# Patient Record
Sex: MALE | Race: WHITE | NOT HISPANIC OR LATINO | URBAN - NONMETROPOLITAN AREA
[De-identification: names, ages, dates, MRNs, and addresses within clinical notes are randomized per-mention and may not be internally consistent; named-entity substitution may affect disease eponyms.]

---
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
## 2024-01-05 ENCOUNTER — OFFICE VISIT (OUTPATIENT)
Dept: URGENT CARE | Facility: PHYSICIAN GROUP | Age: 24
End: 2024-01-05
Payer: COMMERCIAL

## 2024-01-05 VITALS
SYSTOLIC BLOOD PRESSURE: 133 MMHG | HEART RATE: 79 BPM | OXYGEN SATURATION: 99 % | DIASTOLIC BLOOD PRESSURE: 78 MMHG | WEIGHT: 223 LBS | RESPIRATION RATE: 16 BRPM | BODY MASS INDEX: 31.92 KG/M2 | HEIGHT: 70 IN | TEMPERATURE: 98.3 F

## 2024-01-05 DIAGNOSIS — R21 RASH: ICD-10-CM

## 2024-01-05 DIAGNOSIS — L03.316 CELLULITIS OF UMBILICUS: ICD-10-CM

## 2024-01-05 PROCEDURE — 3075F SYST BP GE 130 - 139MM HG: CPT

## 2024-01-05 PROCEDURE — 3078F DIAST BP <80 MM HG: CPT

## 2024-01-05 PROCEDURE — 99213 OFFICE O/P EST LOW 20 MIN: CPT

## 2024-01-05 RX ORDER — CLOTRIMAZOLE 1 %
1 CREAM (GRAM) TOPICAL 2 TIMES DAILY
Qty: 60 G | Refills: 0 | Status: SHIPPED | OUTPATIENT
Start: 2024-01-05

## 2024-01-05 RX ORDER — CEPHALEXIN 500 MG/1
500 CAPSULE ORAL 4 TIMES DAILY
Qty: 20 CAPSULE | Refills: 0 | Status: SHIPPED | OUTPATIENT
Start: 2024-01-05 | End: 2024-01-10

## 2024-01-05 NOTE — PROGRESS NOTES
"Subjective:   Kush Ceja is a 23 y.o. male who presents for Rash (Rash on belly button x1-2 months. Initially resolved but returned and worsened. Area around belly button is red with crackly flaky skin. )      HPI:    Patient presents urgent care with rash inside his bellybutton states it has been present for couple of months.  He states it first started as a scaly and flaky and slightly red and has worsened.  He states he has been cleaning it with a Q-tip and rubbing alcohol which is trying to.  But it continues to drain clear to blood-tinged fluid occasionally.  He has not tried anything over-the-counter for symptoms.  He states he developed a satellite lesion on the left mid forehead just below the hairline which is reminiscent of what his bellybutton lesion looks like.  He denies rash on his feet, hands, groin.  He denies presence of rash on other parts of his body.  He states the rash inside his bellybutton is now tender to the touch and warm  He denies fever, chills, body aches.      ROS As above in HPI    Medications:    No current outpatient medications on file prior to visit.     No current facility-administered medications on file prior to visit.        Allergies:   Patient has no known allergies.    Problem List:   There is no problem list on file for this patient.       Surgical History:  No past surgical history on file.    Past Social Hx:   Social History     Tobacco Use    Smoking status: Never    Smokeless tobacco: Never   Vaping Use    Vaping Use: Never used   Substance Use Topics    Alcohol use: Yes     Alcohol/week: 7.2 oz     Types: 12 Cans of beer per week    Drug use: Never          Problem list, medications, and allergies reviewed by myself today in Epic.     Objective:     /78 (BP Location: Right arm, Patient Position: Sitting, BP Cuff Size: Adult)   Pulse 79   Temp 36.8 °C (98.3 °F) (Temporal)   Resp 16   Ht 1.778 m (5' 10\")   Wt 101 kg (223 lb)   SpO2 99%   BMI 32.00 kg/m² "     Physical Exam  Vitals and nursing note reviewed.   Constitutional:       Appearance: Normal appearance.   HENT:      Head: Normocephalic.   Cardiovascular:      Rate and Rhythm: Normal rate and regular rhythm.      Heart sounds: Normal heart sounds.   Pulmonary:      Effort: Pulmonary effort is normal.      Breath sounds: Normal breath sounds.   Abdominal:      General: Bowel sounds are normal. There is no distension.      Palpations: Abdomen is soft. There is no mass.      Tenderness: There is no abdominal tenderness. There is no guarding or rebound.      Hernia: No hernia is present.   Skin:     Findings: Rash present.      Comments: Umbilicus is erythematous, edematous, warm to the touch.  No fluctuance.  There is fissuring.  There is serosanguineous drainage present.    There is a small pink scaly lesion on the mid left forehead just inferior to the hairline.  It is approximately 8.5 cm x 0.5 cm in size.  It is not tender to palpation, warm to the touch, fluctuant.  No vesicles, bullae, urticaria present.   Neurological:      Mental Status: He is alert.         Assessment/Plan:     Diagnosis and associated orders:   1. Rash  - clotrimazole (LOTRIMIN) 1 % Cream; Apply 1 Application topically 2 times a day.  Dispense: 60 g; Refill: 0    2. Cellulitis of umbilicus  - cephALEXin (KEFLEX) 500 MG Cap; Take 1 Capsule by mouth 4 times a day for 5 days.  Dispense: 20 Capsule; Refill: 0  - mupirocin (BACTROBAN) 2 % Ointment; Apply 1 Application topically 2 times a day.  Dispense: 22 g; Refill: 0        Comments/MDM:     Rash involving umbilicus appears to be infected.   Patient advised to wash skin with unscented soap, pat dry, apply thin layer of Lotrimin cream and allow it to dry down before applying Bactroban.  Will start him on a 5-day course of Keflex for cellulitis.  He is also instructed to apply the Lotrimin cream to the left scaly lesion on his forehead.  He denies presence of rash in other parts of his  body.  Return to urgent care should infection and rash fail to improve.  Patient is aware that this may take several weeks to resolve.  Patient verbalized understanding and consented plan of care.         Please note that this dictation was created using voice recognition software. I have made a reasonable attempt to correct obvious errors, but I expect that there are errors of grammar and possibly content that I did not discover before finalizing the note.    This note was electronically signed by EMILEE Pedersen

## 2024-01-17 DIAGNOSIS — R21 RASH: ICD-10-CM

## 2024-04-16 RX ORDER — KETOCONAZOLE 20 MG/G
CREAM TOPICAL
Qty: 60 G | Refills: 2 | OUTPATIENT
Start: 2024-04-16